# Patient Record
Sex: MALE | Race: WHITE | Employment: FULL TIME | URBAN - METROPOLITAN AREA
[De-identification: names, ages, dates, MRNs, and addresses within clinical notes are randomized per-mention and may not be internally consistent; named-entity substitution may affect disease eponyms.]

---

## 2022-02-21 ENCOUNTER — HOSPITAL ENCOUNTER (EMERGENCY)
Age: 27
Discharge: HOME | End: 2022-02-21
Payer: COMMERCIAL

## 2022-02-21 ENCOUNTER — APPOINTMENT (OUTPATIENT)
Dept: CT IMAGING | Age: 27
End: 2022-02-21
Payer: COMMERCIAL

## 2022-02-21 VITALS
RESPIRATION RATE: 16 BRPM | HEART RATE: 82 BPM | HEIGHT: 69 IN | BODY MASS INDEX: 44.43 KG/M2 | WEIGHT: 300 LBS | OXYGEN SATURATION: 98 % | DIASTOLIC BLOOD PRESSURE: 74 MMHG | SYSTOLIC BLOOD PRESSURE: 128 MMHG

## 2022-02-21 DIAGNOSIS — S39.012A STRAIN OF LUMBAR REGION, INITIAL ENCOUNTER: Primary | ICD-10-CM

## 2022-02-21 PROCEDURE — 6370000000 HC RX 637 (ALT 250 FOR IP): Performed by: PHYSICIAN ASSISTANT

## 2022-02-21 PROCEDURE — 72131 CT LUMBAR SPINE W/O DYE: CPT

## 2022-02-21 PROCEDURE — 6360000002 HC RX W HCPCS: Performed by: PHYSICIAN ASSISTANT

## 2022-02-21 PROCEDURE — 99283 EMERGENCY DEPT VISIT LOW MDM: CPT

## 2022-02-21 PROCEDURE — 96372 THER/PROPH/DIAG INJ SC/IM: CPT

## 2022-02-21 RX ORDER — CYCLOBENZAPRINE HCL 10 MG
10 TABLET ORAL ONCE
Status: COMPLETED | OUTPATIENT
Start: 2022-02-21 | End: 2022-02-21

## 2022-02-21 RX ORDER — NAPROXEN 500 MG/1
500 TABLET ORAL 2 TIMES DAILY WITH MEALS
Qty: 20 TABLET | Refills: 0 | Status: SHIPPED | OUTPATIENT
Start: 2022-02-21 | End: 2022-03-16

## 2022-02-21 RX ORDER — METHYLPREDNISOLONE 4 MG/1
TABLET ORAL
Qty: 1 KIT | Refills: 0 | Status: SHIPPED | OUTPATIENT
Start: 2022-02-21 | End: 2022-02-27

## 2022-02-21 RX ORDER — METHOCARBAMOL 500 MG/1
500 TABLET, FILM COATED ORAL 4 TIMES DAILY PRN
Qty: 30 TABLET | Refills: 0 | Status: SHIPPED | OUTPATIENT
Start: 2022-02-21 | End: 2022-03-03

## 2022-02-21 RX ORDER — PREDNISONE 20 MG/1
60 TABLET ORAL ONCE
Status: COMPLETED | OUTPATIENT
Start: 2022-02-21 | End: 2022-02-21

## 2022-02-21 RX ORDER — KETOROLAC TROMETHAMINE 30 MG/ML
30 INJECTION, SOLUTION INTRAMUSCULAR; INTRAVENOUS ONCE
Status: COMPLETED | OUTPATIENT
Start: 2022-02-21 | End: 2022-02-21

## 2022-02-21 RX ADMIN — PREDNISONE 60 MG: 20 TABLET ORAL at 16:07

## 2022-02-21 RX ADMIN — KETOROLAC TROMETHAMINE 30 MG: 30 INJECTION, SOLUTION INTRAMUSCULAR; INTRAVENOUS at 15:05

## 2022-02-21 RX ADMIN — CYCLOBENZAPRINE 10 MG: 10 TABLET, FILM COATED ORAL at 15:05

## 2022-02-21 ASSESSMENT — PAIN - FUNCTIONAL ASSESSMENT: PAIN_FUNCTIONAL_ASSESSMENT: 0-10

## 2022-02-21 ASSESSMENT — PAIN SCALES - GENERAL
PAINLEVEL_OUTOF10: 8
PAINLEVEL_OUTOF10: 9

## 2022-02-21 ASSESSMENT — PAIN DESCRIPTION - LOCATION: LOCATION: BACK

## 2022-02-21 ASSESSMENT — PAIN DESCRIPTION - ORIENTATION: ORIENTATION: LEFT;LOWER

## 2022-02-21 ASSESSMENT — PAIN DESCRIPTION - PAIN TYPE: TYPE: ACUTE PAIN

## 2022-02-21 NOTE — ED PROVIDER NOTES
[facility]      Department of Emergency Medicine   ED  Provider Note  Admit Date/RoomTime: 2/21/2022  1:35 PM  ED Room: 37/37        2:38 PM EST      HPI: Casey Cisneros 26 y.o.male with No past medical history on file. who presents with left sided lower back pain. The patient states that the back pain began 2 days. The history is obtained from the patient. The mechanism of the injury is apparent. The patient states that the pain is moderate. It is worsened by movement including flexion and extension of the back as well as rotation. Patient denies any distal neurovascular complaints including numbness tingling or weakness. There are no bowel or bladder symptoms reported. No incontinence and no urinary retention. The patient denies saddle or groin anesthesia. The patient also denies fevers, chills, weight loss, night sweats, IV drug use, or recent illness. Review of Systems:   Pertinent positives and negatives are stated within HPI, all other systems reviewed and are negative.      --------------------------------------------- PAST HISTORY ---------------------------------------------  Past Medical History:  has no past medical history on file. Past Surgical History:  has no past surgical history on file. Social History:  reports that he has never smoked. He does not have any smokeless tobacco history on file. He reports that he does not drink alcohol. Family History: family history is not on file. The patients home medications have been reviewed. Allergies: Patient has no known allergies. Nursing Notes Reviewed by myself  Vitals Signs Reviewed by myself  BP (!) 143/88   Pulse 88   Resp 16   Ht 5' 9\" (1.753 m)   Wt 300 lb (136.1 kg)   SpO2 98%   BMI 44.30 kg/m²         Physical exam:  Constitutional:  The patient is comfortable, well appearing, non toxic in NAD. Patient is alert and oriented x3. Head: Head is atraumatic and normocephalic.   Eyes: There is no discharge from the eyes. Sclerae are normal.  ENT: The oropharynx is normal. The mouth is normal to inspection. Neck: Normal range of motion of the neck is present there is no JVD present no meningeal signs are present. Respiratory/chest: The chest is nontender breath sounds are normal  Cardiovascular: Regular rate and rhythm is noted. No murmurs. No rubs or gallops. Skin: Skin is warm and dry, Skin exam normal  Neurologic exam: The patient's [place] Coma Scale is 15. No focal motor deficits. There are no focal sensory deficits. Deep tendon reflexes are intact and present bilaterally in the lower extremities. Babinski is absent. Gait is normal. Symmetric strength and sensation in the lower extremities bilaterally. Back exam: The patient has reproducible tenderness to palpation in the paravertebral lumbar region on the left. There is no evidence of localized erythema nor is there any focal warmth to the back. The patient has no evidence of single vertebral tenderness or any single area of interspace tenderness. There are no palpable deformities, lacerations, abrasions, or stepoffs. No midline cervical, thoracic, or lumbar spine tenderness. Medical decision making:   Mechanical lumbosacral strain without evidence of fracture or neurologic compromise.      Plan:  Review of past medical records for appropriate pain control and outpatient referral.               Impression:  Lumbosacral Strain    Disposition:  Discharge    Condition:  Stable        LIBRADO Garcia  02/21/22 0983

## 2022-02-28 ENCOUNTER — OFFICE VISIT (OUTPATIENT)
Dept: FAMILY MEDICINE CLINIC | Age: 27
End: 2022-02-28
Payer: COMMERCIAL

## 2022-02-28 VITALS
SYSTOLIC BLOOD PRESSURE: 115 MMHG | BODY MASS INDEX: 45.77 KG/M2 | RESPIRATION RATE: 18 BRPM | HEART RATE: 79 BPM | OXYGEN SATURATION: 98 % | DIASTOLIC BLOOD PRESSURE: 80 MMHG | TEMPERATURE: 97.3 F | WEIGHT: 309 LBS | HEIGHT: 69 IN

## 2022-02-28 DIAGNOSIS — M51.26 LUMBAR DISC HERNIATION: ICD-10-CM

## 2022-02-28 DIAGNOSIS — M54.50 LEFT-SIDED LOW BACK PAIN WITHOUT SCIATICA, UNSPECIFIED CHRONICITY: Primary | ICD-10-CM

## 2022-02-28 PROCEDURE — 99203 OFFICE O/P NEW LOW 30 MIN: CPT | Performed by: FAMILY MEDICINE

## 2022-02-28 RX ORDER — CYCLOBENZAPRINE HCL 5 MG
5 TABLET ORAL 3 TIMES DAILY PRN
Qty: 30 TABLET | Refills: 0 | Status: SHIPPED
Start: 2022-02-28 | End: 2022-03-16

## 2022-02-28 RX ORDER — TROLAMINE SALICYLATE 10 %
CREAM (GRAM) TOPICAL
Qty: 170 G | Refills: 0 | Status: SHIPPED | OUTPATIENT
Start: 2022-02-28 | End: 2022-03-07

## 2022-02-28 SDOH — ECONOMIC STABILITY: FOOD INSECURITY: WITHIN THE PAST 12 MONTHS, THE FOOD YOU BOUGHT JUST DIDN'T LAST AND YOU DIDN'T HAVE MONEY TO GET MORE.: NEVER TRUE

## 2022-02-28 SDOH — ECONOMIC STABILITY: FOOD INSECURITY: WITHIN THE PAST 12 MONTHS, YOU WORRIED THAT YOUR FOOD WOULD RUN OUT BEFORE YOU GOT MONEY TO BUY MORE.: NEVER TRUE

## 2022-02-28 ASSESSMENT — ENCOUNTER SYMPTOMS
RHINORRHEA: 0
COUGH: 0
VOMITING: 0
SHORTNESS OF BREATH: 0
DIARRHEA: 0
ABDOMINAL PAIN: 0
BACK PAIN: 1

## 2022-02-28 ASSESSMENT — PATIENT HEALTH QUESTIONNAIRE - PHQ9
SUM OF ALL RESPONSES TO PHQ9 QUESTIONS 1 & 2: 0
2. FEELING DOWN, DEPRESSED OR HOPELESS: 0
SUM OF ALL RESPONSES TO PHQ QUESTIONS 1-9: 0
1. LITTLE INTEREST OR PLEASURE IN DOING THINGS: 0
SUM OF ALL RESPONSES TO PHQ QUESTIONS 1-9: 0

## 2022-02-28 ASSESSMENT — SOCIAL DETERMINANTS OF HEALTH (SDOH): HOW HARD IS IT FOR YOU TO PAY FOR THE VERY BASICS LIKE FOOD, HOUSING, MEDICAL CARE, AND HEATING?: NOT HARD AT ALL

## 2022-02-28 NOTE — PROGRESS NOTES
[facility]  [facility]    Subjective:  New patient  Kee Martinez to ER for back pain  Back sprain after he bent  No red flag symptoms  CT lumbar spine - DDD and neural foraminal rarrowing  Prescribed - medrol dose pack, and muscle relaxors    ROS otherwise negative    Past medical, surgical, family and social history were reviewed, non-contributory, and unchanged unless otherwise stated. Objective:    /80   Pulse 79   Temp 97.3 °F (36.3 °C) (Temporal)   Resp 18   Ht 5' 9\" (1.753 m)   Wt (!) 309 lb (140.2 kg)   SpO2 98%   BMI 45.63 kg/m²     Exam is as noted by resident with the following changes, additions or corrections:    General:  NAD; alert & oriented x 3   Heart:  RRR, no murmurs, gallops, or rubs. Lungs:  CTA bilaterally, no wheeze, rales or rhonchi  Back: + mild tenderness   Extrem:  No clubbing, cyanosis, or edema    Assessment/Plan:    Back pain  continue NSAIDS  Aspercreme  PMR referral  Muscle relaxors  F/u as instructed     Attending Physician Statement  I have reviewed the chart, including any radiology or labs, and have seen the patient with the resident(s). I personally reviewed and performed key elements of the history and exam.  I agree with the assessment, plan and orders as documented by the resident. Please refer to the resident note for additional information.       Electronically signed by Marilia Grove MD on 2/28/2022 at 1:45 PM

## 2022-02-28 NOTE — PROGRESS NOTES
2/28/2022    Brody Varela is a 23 y.o. malehere for:    HPI:  CC- new patient  Here to establish care  Was just seen in ER a week ago for lumbar left sided back pain  Started after bending forward to put the pants on and immediately had left sided pain. CT lumbar spine showed: There is no evidence of acute fracture.  Vertebral alignment is anatomic. There are no compression deformities.  There is mild sclerosis at the   inferior endplate of L4 anteriorly with small Schmorl's node.  There is no   definite central canal stenosis.  There are disc bulges at L4-5 and L5-S1 as   well as possible left central disc protrusion at L5-S1.  There appears to be   right neural foraminal narrowing at L4-5 and L5-S1.  The paravertebral soft   tissue structures are unremarkable. Had toradol there. Discharged home with methocarbamol, medrol pack, naproxen BID  Since then, the pain has actually improved but is still there and patient is worried because he does do heavy duty at work, climbing ladder etc.  Has been going on for 1 week now. Worse with movement, mostly with extension of the back as well as rotation  Denies any associated numbness, tingling or weakness. There are no bowel or bladder symptoms reported. No incontinence and no urinary retention. The patient denies saddle or groin anesthesia. The patient also denies fevers, chills, weight loss, night sweats, IV drug use, or recent illness. Smoking 10 cigarettes a day- not ready yet to quit despite counseling         BP Readings from Last 3 Encounters:   02/28/22 115/80   02/21/22 128/74     Current Outpatient Medications   Medication Sig Dispense Refill    trolamine salicylate (ASPERCREME ORIGINAL) 10 % cream Apply topically as needed.  170 g 0    cyclobenzaprine (FLEXERIL) 5 MG tablet Take 1 tablet by mouth 3 times daily as needed for Muscle spasms 30 tablet 0    methocarbamol (ROBAXIN) 500 MG tablet Take 1 tablet by mouth 4 times daily as needed (pain) 30 tablet 0    naproxen (NAPROSYN) 500 MG tablet Take 1 tablet by mouth 2 times daily (with meals) 20 tablet 0     No current facility-administered medications for this visit. No Known Allergies    Past Medical & Surgical History:  History reviewed. No pertinent past medical history. Past Surgical History:   Procedure Laterality Date    LASIK      TYMPANOSTOMY TUBE PLACEMENT         Family History:      Problem Relation Age of Onset    Diabetes Mother        Social History:  Social History     Tobacco Use    Smoking status: Current Some Day Smoker     Types: Cigarettes    Smokeless tobacco: Current User   Substance Use Topics    Alcohol use: No       Immunization History   Administered Date(s) Administered    Adenovirus, Type 4 And 7, Live, Oral (Admin As 2 Tab) 07/06/2015    Anthrax (BioThrax) 03/21/2016, 03/22/2016, 06/07/2016    DTaP vaccine 05/04/2002    Hepatitis A/Hepatitis B (Twinrix) 07/06/2015, 08/06/2015, 02/09/2016    Influenza A (Y4H1-27) Vaccine PF IM 12/09/2009    Influenza Virus Vaccine 12/02/2015, 11/14/2016, 09/25/2017, 10/26/2018    MMR 10/12/2001    Meningococcal MCV4P (Menactra) 07/06/2015    Polio IPV (IPOL) 10/12/2001, 08/06/2015    Tdap (Boostrix, Adacel) 07/06/2015    Typhoid Vi capsular polysaccharide (Typhim VI) 08/06/2015, 10/24/2017    Yellow Fever (YF-Vax) 08/06/2015       Review of Systems   Constitutional: Negative for chills and fever. HENT: Negative for congestion and rhinorrhea. Eyes: Negative for visual disturbance. Respiratory: Negative for cough and shortness of breath. Cardiovascular: Negative for chest pain and leg swelling. Gastrointestinal: Negative for abdominal pain, diarrhea and vomiting. Genitourinary: Negative for dysuria and hematuria. Musculoskeletal: Positive for back pain. Negative for arthralgias. Skin: Negative for rash. Neurological: Negative for weakness and numbness.    Psychiatric/Behavioral: Negative for dysphoric mood. The patient is not nervous/anxious. VS:  /80   Pulse 79   Temp 97.3 °F (36.3 °C) (Temporal)   Resp 18   Ht 5' 9\" (1.753 m)   Wt (!) 309 lb (140.2 kg)   SpO2 98%   BMI 45.63 kg/m²     Physical Exam  Vitals reviewed. Constitutional:       General: He is not in acute distress. Appearance: Normal appearance. He is not ill-appearing. HENT:      Head: Normocephalic and atraumatic. Nose: Nose normal.      Mouth/Throat:      Mouth: Mucous membranes are moist.   Eyes:      General: No scleral icterus. Conjunctiva/sclera: Conjunctivae normal.   Cardiovascular:      Rate and Rhythm: Normal rate and regular rhythm. Heart sounds: Normal heart sounds. No murmur heard. Pulmonary:      Effort: Pulmonary effort is normal. No respiratory distress. Breath sounds: Normal breath sounds. No wheezing or rhonchi. Abdominal:      General: There is no distension. Palpations: Abdomen is soft. Tenderness: There is no abdominal tenderness. Musculoskeletal:         General: Tenderness (TTP prevertebral muscle tenderness midline lumbar and left sided muscles. no sensitivity or strength issues. ) present. Cervical back: Normal range of motion and neck supple. No rigidity. Right lower leg: No edema. Left lower leg: No edema. Comments: ROM limited on full extension because of pain, but can do it   Skin:     Findings: No rash. Neurological:      General: No focal deficit present. Mental Status: He is alert and oriented to person, place, and time. Sensory: No sensory deficit. Motor: No weakness. Psychiatric:         Mood and Affect: Mood normal.         Behavior: Behavior normal.         Assessment/Plan:  1. Left-sided low back pain without sciatica, unspecified chronicity  Seems musculoskeletal pain for this current episode. Does have hx of chronic intermittent back pain likely due to underlying disc herniation seen on the CT scan. No red flags. Supportive and symptomatic treatment with Aspercreme and continue with naproxen. Will add some Flexeril for symptomatic treatment. - trolamine salicylate (ASPERCREME ORIGINAL) 10 % cream; Apply topically as needed. Dispense: 170 g; Refill: 0  - [facility]en Lewis [facility], [facility]  - cyclobenzaprine (FLEXERIL) 5 MG tablet; Take 1 tablet by mouth 3 times daily as needed for Muscle spasms  Dispense: 30 tablet; Refill: 0    2. Lumbar disc herniation  Refer to PM&R. Patient may benefit from physical therapy  - [address] [place], [place] [address]Yen DO, [facility]      Follow up: As needed if symptoms worsen or fail to improve    Patient agrees with the above stated plan. Jenn Yeni received counseling on the following healthy behaviors: nutrition, exercise and tobacco cessation.     James Smith MD  PGY-3 Family Medicine

## 2022-03-02 ENCOUNTER — TELEPHONE (OUTPATIENT)
Dept: FAMILY MEDICINE CLINIC | Age: 27
End: 2022-03-02

## 2022-03-02 NOTE — TELEPHONE ENCOUNTER
----- Message from Padminicaron Joiedy sent at 3/2/2022 12:47 PM EST -----  Subject: Referral Request    QUESTIONS   Reason for referral request? Referred to physical therapy, but it won't   begin until 4/11. He needs relief before then because he can't do his job   currently. Pain is unchanged from previous appt. Please contact him to   advise. Has the physician seen you for this condition before? No   Preferred Specialist (if applicable)? Do you already have an appointment scheduled? No  Additional Information for Provider?   ---------------------------------------------------------------------------  --------------  CALL BACK INFO  What is the best way for the office to contact you? OK to leave message on   voicemail  Preferred Call Back Phone Number?  [phone]

## 2022-03-02 NOTE — TELEPHONE ENCOUNTER
As discussed in the office, there were no red flags, and he is started on medications for pain relief such as naproxen and flexeril and the cream locally.    Thank you

## 2022-03-04 ENCOUNTER — TELEPHONE (OUTPATIENT)
Dept: FAMILY MEDICINE CLINIC | Age: 27
End: 2022-03-04

## 2022-03-04 NOTE — LETTER
[address] [address]  Phone: [phone]  Fax: [phone]    Quique Parks MD        March 8, 2022     Patient: Ronny Jimenez   YOB: 1995   Date of Visit: 3/4/2022       To Whom It May Concern: It is my medical opinion that Themorales Bookman should remain out of work until 3/16/22, and be reevaluated from us at that time. If you have any questions or concerns, please don't hesitate to call.     Sincerely,        Quiqeu Parks MD

## 2022-03-04 NOTE — TELEPHONE ENCOUNTER
Patient called in regarding work, his job does not have light duty available, wanted to know if you could take him off work until he has some relief.

## 2022-03-04 NOTE — Clinical Note
[address] [address]  Phone: [phone]  Fax: [phone]    Amada Borges MD        March 8, 2022     Patient: Jenna Cabrera   YOB: 1995   Date of Visit: 3/4/2022       To Whom It May Concern: It is my medical opinion that Napoleon Cartagena {Work release (duty restriction):67629}. If you have any questions or concerns, please don't hesitate to call.     Sincerely,        Amada Borges MD

## 2022-03-08 NOTE — TELEPHONE ENCOUNTER
Spoke to patient. Back pain has improved since day one, but continues to have pain exacerbated with heavy lifting, bending forward or standing up for prolonged time. His work requires all these activities, so an excuse letter will be sent to patient for another week. He will need to be seen and evaluated in the office to decide forward.

## 2022-03-15 ENCOUNTER — OFFICE VISIT (OUTPATIENT)
Dept: FAMILY MEDICINE CLINIC | Age: 27
End: 2022-03-15
Payer: COMMERCIAL

## 2022-03-15 VITALS
OXYGEN SATURATION: 98 % | RESPIRATION RATE: 18 BRPM | WEIGHT: 310 LBS | TEMPERATURE: 97.5 F | HEIGHT: 69 IN | SYSTOLIC BLOOD PRESSURE: 110 MMHG | DIASTOLIC BLOOD PRESSURE: 80 MMHG | BODY MASS INDEX: 45.91 KG/M2 | HEART RATE: 85 BPM

## 2022-03-15 DIAGNOSIS — M54.50 LEFT-SIDED LOW BACK PAIN WITHOUT SCIATICA, UNSPECIFIED CHRONICITY: Primary | ICD-10-CM

## 2022-03-15 DIAGNOSIS — M51.26 LUMBAR DISC HERNIATION: ICD-10-CM

## 2022-03-15 PROCEDURE — 99213 OFFICE O/P EST LOW 20 MIN: CPT | Performed by: FAMILY MEDICINE

## 2022-03-15 NOTE — PROGRESS NOTES
Low back pain of 3 weeks  Was evaluated in the ED  CT disc herniation  Feeling better with rest  No radiation  Examination  Blood pressure 110/80, pulse 85, temperature 97.5 °F (36.4 °C), temperature source Temporal, resp. rate 18, height 5' 9\" (1.753 m), weight (!) 310 lb (140.6 kg), SpO2 98 %. No weakness  Normal reflexes  A/P  Follow up with [facility]  Attending Physician Statement  I have discussed the case, including pertinent history and exam findings with the resident. I agree with the documented assessment and plan.

## 2022-03-15 NOTE — PROGRESS NOTES
3/15/2022    Nessa Rashid is a 23 y.o. malehere for:    HPI:  CC- back pain  Back pain has improved since first day, but continues to have pain exacerbated with heavy lifting, bending forward or standing up for prolonged time. His work requires all these activities, so an excuse letter was sent to patient 2 weeks ago. Had CT lumbar spine done which showed:    1. No acute fracture or subluxation. 2. Mild degenerative changes in the lumbar spine without definite central   canal stenosis.  There is possible right neural foraminal narrowing at L4-5   and L5-S1. Has an appt with [facility] April 11 th   He is here today for deciding regarding clearance for work  Would like to have FMLA papers filled out because he continues to have the back pain with positional movements, even though it gas greatly improved. 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 Patient states that he feels that the back pain has greatly improved, but is worried if working will exacerbate or cause any damage. Denies any numbness or weakness, saddle anesthesia, urinary or fecal incontinence. No limping or any issues walking. Residual symptoms are only provoked left-sided back pain when standing up after sitting for prolonged time, sometimes when bending forward. Has not even been using any of the naproxen or Flexeril anymore. BP Readings from Last 3 Encounters:   03/15/22 110/80   02/28/22 115/80   02/21/22 128/74     Current Outpatient Medications   Medication Sig Dispense Refill    cyclobenzaprine (FLEXERIL) 5 MG tablet Take 1 tablet by mouth 3 times daily as needed for Muscle spasms 30 tablet 0    naproxen (NAPROSYN) 500 MG tablet Take 1 tablet by mouth 2 times daily (with meals) 20 tablet 0     No current facility-administered medications for this visit. No Known Allergies    Past Medical & Surgical History:  No past medical history on file.   Past Surgical History:   Procedure Laterality Date    LASIK      TYMPANOSTOMY TUBE PLACEMENT         Family History:      Problem Relation Age of Onset    Diabetes Mother        Social History:  Social History     Tobacco Use    Smoking status: Current Some Day Smoker     Types: Cigarettes    Smokeless tobacco: Current User   Substance Use Topics    Alcohol use: No       Immunization History   Administered Date(s) Administered    Adenovirus, Type 4 And 7, Live, Oral (Admin As 2 Tab) 07/06/2015    Anthrax (BioThrax) 03/21/2016, 03/22/2016, 06/07/2016    DTaP vaccine 05/04/2002    Hepatitis A/Hepatitis B (Twinrix) 07/06/2015, 08/06/2015, 02/09/2016    Influenza A (W3G2-19) Vaccine PF IM 12/09/2009    Influenza Virus Vaccine 12/02/2015, 11/14/2016, 09/25/2017, 10/26/2018    MMR 10/12/2001    Meningococcal MCV4P (Menactra) 07/06/2015    Polio IPV (IPOL) 10/12/2001, 08/06/2015    Tdap (Boostrix, Adacel) 07/06/2015    Typhoid Vi capsular polysaccharide (Typhim VI) 08/06/2015, 10/24/2017    Yellow Fever (YF-Vax) 08/06/2015       Review of Systems   Constitutional: Negative for chills and fever. HENT: Negative for congestion and rhinorrhea. Eyes: Negative for visual disturbance. Respiratory: Negative for cough and shortness of breath. Cardiovascular: Negative for chest pain and leg swelling. Gastrointestinal: Negative for abdominal pain, diarrhea and vomiting. Genitourinary: Negative for dysuria and hematuria. Musculoskeletal: Positive for back pain. Skin: Negative for rash. Neurological: Negative for weakness and numbness. Psychiatric/Behavioral: Negative for dysphoric mood. The patient is not nervous/anxious. VS:  /80   Pulse 85   Temp 97.5 °F (36.4 °C) (Temporal)   Resp 18   Ht 5' 9\" (1.753 m)   Wt (!) 310 lb (140.6 kg)   SpO2 98%   BMI 45.78 kg/m²     Physical Exam  Vitals reviewed. Constitutional:       General: He is not in acute distress. Appearance: Normal appearance. He is not ill-appearing.    HENT: Head: Normocephalic and atraumatic. Mouth/Throat:      Mouth: Mucous membranes are moist.   Eyes:      General: No scleral icterus. Conjunctiva/sclera: Conjunctivae normal.   Cardiovascular:      Rate and Rhythm: Normal rate and regular rhythm. Heart sounds: Normal heart sounds. No murmur heard. Pulmonary:      Effort: Pulmonary effort is normal. No respiratory distress. Breath sounds: Normal breath sounds. No wheezing or rhonchi. Abdominal:      General: There is no distension. Palpations: Abdomen is soft. Tenderness: There is no abdominal tenderness. Musculoskeletal:         General: Normal range of motion. Cervical back: Normal range of motion and neck supple. No rigidity. Right lower leg: No edema. Left lower leg: No edema. Comments: Normal range of motion of the back. No radiculopathy. Some paravertebral muscle tenderness over lumbar spine and left-sided muscles. No focal deficits strength or sensory. Skin:     Findings: No rash. Neurological:      General: No focal deficit present. Mental Status: He is alert and oriented to person, place, and time. Deep Tendon Reflexes: Reflexes normal.   Psychiatric:         Mood and Affect: Mood normal.         Behavior: Behavior normal.         Assessment/Plan:  Cori Beyer was seen today for back pain. Diagnoses and all orders for this visit:    Left-sided low back pain without sciatica, unspecified chronicity  Clinically improving with only some residual symptoms with positional movements. CT lumbar spine did not show any concerning findings just mild disc herniation. Patient cleared for work with restrictions as written on the letter. Has an upcoming appointment with [facility] April 14th. Lumbar disc herniation  As above. No lumbar radiculopathy. Follow up: In 6 weeks for back pain    Patient agrees with the above stated plan.       Bruna López MD  PGY-3 Family Medicine

## 2022-03-15 NOTE — LETTER
[address] [address]  Phone: [phone]  Fax: [phone]    Sarah Simons MD        March 15, 2022     Patient: Sergio Craig   YOB: 1995   Date of Visit: 3/15/2022       To Whom It May Concern: It is my medical opinion that Wilman Manzo may return to work on 3/16/22 with the following restrictions: lifting/carrying not to exceed 10 lbs. , pushing/pulling not to exceed 10 lbs. , bending/stooping not to exceed 10 x per hour, avoid excessive walking or standing, Duration of restrictions (days): Until seen by follow up provider on April 11 th. If you have any questions or concerns, please don't hesitate to call.     Sincerely,        Sarah Simons MD

## 2022-03-16 ASSESSMENT — ENCOUNTER SYMPTOMS
SHORTNESS OF BREATH: 0
DIARRHEA: 0
ABDOMINAL PAIN: 0
RHINORRHEA: 0
COUGH: 0
VOMITING: 0
BACK PAIN: 1

## 2022-04-11 ENCOUNTER — OFFICE VISIT (OUTPATIENT)
Dept: PHYSICAL MEDICINE AND REHAB | Age: 27
End: 2022-04-11
Payer: COMMERCIAL

## 2022-04-11 VITALS
HEART RATE: 89 BPM | HEIGHT: 69 IN | SYSTOLIC BLOOD PRESSURE: 141 MMHG | WEIGHT: 309 LBS | DIASTOLIC BLOOD PRESSURE: 85 MMHG | BODY MASS INDEX: 45.77 KG/M2

## 2022-04-11 DIAGNOSIS — M53.3 SACROILIAC JOINT DYSFUNCTION OF LEFT SIDE: Primary | ICD-10-CM

## 2022-04-11 DIAGNOSIS — M51.36 BULGING OF LUMBAR INTERVERTEBRAL DISC: ICD-10-CM

## 2022-04-11 PROCEDURE — 99204 OFFICE O/P NEW MOD 45 MIN: CPT | Performed by: PHYSICAL MEDICINE & REHABILITATION

## 2022-04-11 RX ORDER — IBUPROFEN 600 MG/1
TABLET ORAL
Qty: 120 TABLET | Refills: 2 | Status: SHIPPED
Start: 2022-04-11 | End: 2022-04-11 | Stop reason: ALTCHOICE

## 2022-04-11 RX ORDER — PREDNISONE 20 MG/1
60 TABLET ORAL DAILY
Qty: 30 TABLET | Refills: 0 | Status: SHIPPED | OUTPATIENT
Start: 2022-04-11 | End: 2022-04-21

## 2022-04-11 RX ORDER — ACETAMINOPHEN 500 MG
500 TABLET ORAL 4 TIMES DAILY PRN
Qty: 120 TABLET | Refills: 5 | Status: SHIPPED | OUTPATIENT
Start: 2022-04-11

## 2022-04-11 RX ORDER — IBUPROFEN 600 MG/1
600 TABLET ORAL EVERY 8 HOURS PRN
Qty: 120 TABLET | Refills: 2 | Status: SHIPPED | OUTPATIENT
Start: 2022-04-11 | End: 2022-05-11

## 2022-04-11 RX ORDER — IBUPROFEN 200 MG
200 TABLET ORAL EVERY 6 HOURS PRN
COMMUNITY

## 2022-04-11 NOTE — LETTER
[facility] [address]  Phone: [phone]  Fax: [phone] Jeyson Lehman DO        April 11, 2022     Patient: Ky Glass   YOB: 1995   Date of Visit: 4/11/2022       To Whom It May Concern: It is my medical opinion that Karel Tran is able to work as defined: Light work not  exerting up to 20 pounds of force occasionally, and/or up to 10 pounds of force frequently, and/or for a negligible amount of force constantly ( constantly: activity or condition exists two-thirds or more of the time) to move objects. Physical demand may be only a negligible amount, a job should be rated light work (1) when it requires walking or standing to a significant degree; or (2) when it requires sitting most of the time, but entails pushing and/or pulling of arms or leg controls; and/or (3) when the job requires working at a production rate pace entailing the constant pushing and/or pulling of materials even though the weight of those materials is negligible. .    If you have any questions or concerns, please don't hesitate to call. Sincerely,      Nura Gutiérrez D.O., P.T.   Board Certified Physical Medicine and Rehabilitation  Board Certified [phone] Margarita Lehman DO

## 2022-04-11 NOTE — PROGRESS NOTES
Yves Rodríguez [facility]  [address] [facility] [address]  Phone: [phone]  Fax: [phone]      PCP: eNda Norman MD  Date of visit: 4/13/22    Chief Complaint   Patient presents with    Back Pain     New patient referred by [facility]       Dear Dr. David Machado,    As you know,  Jack Nissen is a 23 y.o.  right hand dominant man with sudden onset of low back  pain after bending over to pick something up about 2 months ago. Now, the pain is constant and occurs daily. The pain is rated Pain Score:   4, is described as pressures, and is located in the left low back without radiation. The symptoms have been better since onset. The pain is better with sitting and resting. The pain is worse with lifting. I have reviewed the following prior workup has included: Dr. Inés Patel office notes. I have personally interpreted the following tests: Ct lumbar showed lumbar bulge L4-5 and L5-S1 with mild stenosis. The prior treatment has included: medrol dosepak, ibuprofen, muscle relaxer, ice. An independent historian was not needed. History reviewed. No pertinent past medical history. Past Surgical History:   Procedure Laterality Date    LASIK      TYMPANOSTOMY TUBE PLACEMENT       Social History     Tobacco Use    Smoking status: Current Some Day Smoker     Packs/day: 9.00     Types: Cigarettes    Smokeless tobacco: Current User   Substance Use Topics    Alcohol use: No    Drug use: Never   Works as a  for [employer].      Family History   Problem Relation Age of Onset    Diabetes Mother        No Known Allergies    Current Outpatient Medications   Medication Sig Dispense Refill    ibuprofen (ADVIL;MOTRIN) 200 MG tablet Take 200 mg by mouth every 6 hours as needed for Pain      predniSONE (DELTASONE) 20 MG tablet Take 3 tablets by mouth daily for 10 days 30 tablet 0    acetaminophen (TYLENOL) 500 MG tablet Take 1 tablet by mouth 4 times daily as needed for Pain Can take with prednisone 120 tablet 5    ibuprofen (ADVIL;MOTRIN) 600 MG tablet Take 1 tablet by mouth every 8 hours as needed for Pain (with food) Do not take with prednisone. 120 tablet 2    Elastic Bandages & Supports (LUMBAR BACK BRACE/SUPPORT PAD) MISC sacroiliac brace 1 each 0     No current facility-administered medications for this visit. Review of Systems - For review of systems, positive symptoms are underlined and negative findings are not underlined. General: chills, fatigue, fever, malaise, night sweats, weight gain,  weight loss. Psychological: anxiety, depression, suicidal ideation, sleep disturbances, behavioral disorder, difficulty concentrating, disorientation, hallucinations, mood swings, obsessive thoughts, physical abuse,  sexual abuse. Ophthalmic: blurry vision, decreased vision, double vision, loss of vision, photophobia, use of corrective device. Ear Nose Throat: hearing loss, tinnitus, phonophobia, sensitivity to smells, vertigo, or vocal changes. Allergy/Immunology: seasonal allergies, watery eyes, itchy eyes, frequent infections. Hematological and Lymphatic: bleeding problems, blood clots, bruising,  yellowing of the skin, swollen lymph nodes. Endocrine:  polydypsia, polyuria, temperature intolerance. Respiratory: cough, shortness of breath, wheezing. Cardiovascular: syncope, chest pain, dyspnea on exertion, edema, irregular heartbeat,  palpitations. Gastrointestinal: abdominal pain, constipation, diarrhea,  decreased appetite, heartburn, hematemesis, melena, nausea, vomiting, stool incontinence, abnormal swallowing. Genito-Urinary: dysuria, hematuria, incontinence, frequency, urgency. Musculoskeletal: joint pain, stiffness, swelling, muscle pain, muscle  tenderness.  Neurological: confusion, memory loss, dizziness, gait disturbance, headaches, impaired coordination, decreased balance, numbness/tingling, seizures, speech problems, tremors,weakness. Dermatological:  hair changes, nail changes, pruritus, rash. Physical Exam: Blood pressure (!) 141/85, pulse 89, height 5' 9\" (1.753 m), weight (!) 309 lb (140.2 kg). General: The patient is in no apparent distress. Body habitus is obese. HEENT: No rhinorrhea, sneezing, yawning, or lacrimation. No scleral icterus or conjunctival injection. SKIN: No piloerection. No track marks. No rash. Normal turgor. No erythema or ecchymosis. Psychological: Mood and affect are appropriate. Hygiene is appropriate. Cardiovascular:  Heart is regular rate and rhythm. Peripheral pulses are 2+ at the dorsalis pedis, posterior tibial and radial arteries. There is no edema. Respiratory: Respirations are regular and unlabored. There is no cyanosis. Lymphatic: There is no cervical or inguinal lymphadenopathy. Gastrointestinal: Soft abdomen, non-tender. No pulsating abdominal mass. Genitourinary: No costovertebral angle tenderness. MSK: Lumbar:  Cervical lordosis increased,  thoracic kyphosis normal and lumbar lordosis normal.No hairy patch, cafe au lait, nevi, hemangioma or dimpling over lumbar area. Pelvis level, no scoliosis, leg length equal. Seated and standing flexion tests negative. There is no step off deformity. No superficial or bony tenderness. Lumbar AROM in flexion is 60 degrees, in extension is 30 degrees, in left rotation is 30degrees, in right rotation is 30 degrees, in left lateral flexion is 3 degrees and in right lateral flexion is 0 degrees. There is tenderness to palpation at left sacroiliac joint. No tenderness to palpation at bilateral lumbosacral paraspinal muscles, right SIJ, bilateral gluteal muscles,  pubic tubercle,  PSIS,  ischial tuberosity,  greater trochanter,  ASIS,  iliac crest.  No trigger points. No spasm. No edema, erythema, ecchymosis,  mass or deformity. Taut bands absent. Popliteal angle is normal.  Seated straight leg raise negative bilaterally.   SIJ: Gillet negative bilaterally. PEREZ negative bilaterally. ASIS compression test negative bilaterally. Hip: Full painless AROM bilaterally. Srinivasa negative bilaterally. Trendelenburg negative bilaterally. Neurologic: Awake, alert and oriented in three planes. Speech is fluent. No facial weakness. Tongue is midline. Hearing is intact for conversation. Pupils are equal and round. Extraocular muscles are intact. Hearing is intact for conversation. Shoulder shrug symmetric. Sensation: Intact for light touch and pin prick in all upper and lower extremity dermatomes. Vibration and proprioception are intact at the bilateral first MTP. Strength: 5/5 in all myotomes in the upper and lower extremities. Muscle Tendon Reflexes: 2+ symmetric in the bilateral upper and lower extremities. Babinski is downgoing bilaterally. Mariella Kind is negative bilaterally. Gait is Normal.   Romberg is negative. Heel and toe walk are normal.  Tandem walk is normal.  No clonus or spasticity. The patient was able to rise from a chair and squat without difficulty. There is no tremor. Impression:   1. Sacroiliac joint dysfunction of left side    2.  Bulging of lumbar intervertebral disc        Plan:   I have ordered the following unique test(s):  Orders Placed This Encounter   Procedures    XR SACROILIAC JOINTS (MIN 3 VIEWS)     Standing Status:   Future     Number of Occurrences:   1     Standing Expiration Date:   4/12/2023     Order Specific Question:   Reason for exam:     Answer:   sacroiliac pain    Internal Referral to RETAIN     Referral Priority:   Routine     Referral Type:   Eval and Treat     Referral Reason:   Specialty Services Required     Number of Visits Requested:   [facility]     Referral Priority:   Routine     Referral Type:   Eval and Treat     Referral Reason:   Specialty Services Required     Requested Specialty:   Physical Therapy     Number of Visits Requested:   1     Prescription drug management has included:     Orders Placed This Encounter   Medications    predniSONE (DELTASONE) 20 MG tablet     Sig: Take 3 tablets by mouth daily for 10 days     Dispense:  30 tablet     Refill:  0    acetaminophen (TYLENOL) 500 MG tablet     Sig: Take 1 tablet by mouth 4 times daily as needed for Pain Can take with prednisone     Dispense:  120 tablet     Refill:  5    ibuprofen (ADVIL;MOTRIN) 600 MG tablet     Sig: Take 1 tablet by mouth every 8 hours as needed for Pain (with food) Do not take with prednisone. Dispense:  120 tablet     Refill:  2    Elastic Bandages & Supports (LUMBAR BACK BRACE/SUPPORT PAD) MISC     Sig: sacroiliac brace     Dispense:  1 each     Refill:  0      Sacroiliac support brace provided. Medications Discontinued During This Encounter   Medication Reason    ibuprofen (IBU) 800 MG tablet LIST CLEANUP    ibuprofen (ADVIL;MOTRIN) 600 MG tablet Therapy completed     Medications prescribed do require monitoring for toxicity including: CMP reviewed above. Diagnosis and treatment were significantly impacted by social determinants of health including patient is currently off work on short term disability. Reports employer unable to accommodate restrictions. BMI was elevated today, and weight loss plan recommended is : conventional weight loss. Current tobacco abuse, smoking cessation is recommended. There are significant medically determinable impairments. Jennifer Sanabria can hear, speak, remember, understand, concentrate, interact, and adapt without limitation. The claimaint can lift/carry up to 20 pounds at the waist level. Jennifer Sanabria can handle objects without limitation. The claimant can sit for a maximum of 60 minute intervals for a maximum of 8 hours per day;  can stand for a maximum of 30 minute intervals for a total of 8 hours per day; can be in the upright position either standing or walking for a total of 8 hours a day.  Jennifer Sanabria can walk independently community distances without assistive device. Prince may avoid climbing stairs, ramps, stooping, kneeling, crouching, crawling, prolonged sitting/standing/walking. The claimant should avoid repetitive bending, lifting, or twisting. Based on today's examination and review of medical records the claimant is expected to work at the following level:    [   ]  This injured worker has no work limitations. [   ]  This injured worker is incapable of work. [   ]  Lack of work function at any level. [ X ]  This injured worker is capable of work as defined below:    [   ]  \"Sedentary work\"- Sedentary work means exerting up to 10 pounds  of force occasionally (occassionally: activity or condition exists up to 1/3 of the time) and/or a negligible amount of force frequently (frequently: activity or condition exists from 1/3 up to 2/3 of the time) to lift, carry, push, pull or otherwise move objects. Sedentary work involves sitting most of the time, but may involve walking or standing for brief periods of time. Jobs are sedentary if walking and standing are required only occasionally and all other sedentary criteria are met. [  X ] \"Light work\": Light work means exerting up to 20 pounds of force occasionally, and/or up to 10 pounds of force frequently, and/or for a negligible amount of force constantly ( constantly: activity or condition exists two-thirds or more of the time) to move objects. Physical demand may be only a negligible amount, a job should be rated light work (1) when it requires walking or standing to a significant degree; or (2) when it requires sitting most of the time, but entails pushing and/or pulling of arms or leg controls; and/or (3) when the job requires working at a production rate pace entailing the constant pushing and/or pulling of materials even though the weight of those materials is negligible.     [   ] \"Medium work:  Medium work means exerting 20-50 pounds occasionally, and/or 10-25 pounds of force frequently, and/or greater than negligible up to ten pounds of force constantly to move an object. Physical demand requirements are in excess of those for light work. [   ]  Kamar Corn work\": Heavy work means exerting  pounds occasionally, and/or 25-50 pounds frequently, and/or 10-20 pounds constantly to move an object. Physical demand requirements are in excess of those required for medium work. [   ]  \"Very heavy work\": Very heavy work means exerting in excess of 100 pounds of force occasionally, and/or in excess of 50 pounds frequently, and/or in excess of 20   pounds of force constantly to move an object. Physical demand requirements are in excess of those required for heavy work. The claimant is expected to be able to participate in these activities 8 hours a day for 5 days a week on a full time basis. If employer cannot accommodate restrictions, than he cannot work at this time.  The patient was educated about the diagnosis, prognosis, indications, risks and benefits of treatment. An opportunity to ask questions was given to the patient and questions were answered. The patient agreed to proceed with the recommended treatment as described above. Return in about 6 weeks (around 5/23/2022). Thank you for the consultation and for allowing me to participate in the care of this patient. Sincerely,         Freddy Oconnell D.O., P.T.   Board Certified Physical Medicine and Rehabilitation  Board Certified Electrodiagnostic Medicine

## 2022-04-12 ENCOUNTER — TELEPHONE (OUTPATIENT)
Dept: PHYSICAL MEDICINE AND REHAB | Age: 27
End: 2022-04-12

## 2022-04-12 NOTE — TELEPHONE ENCOUNTER
----- Message from Leila Samuel DO sent at 4/12/2022  9:04 AM EDT -----  Test results are normal please notify patient.

## 2022-04-18 ENCOUNTER — EVALUATION (OUTPATIENT)
Dept: PHYSICAL THERAPY | Age: 27
End: 2022-04-18
Payer: COMMERCIAL

## 2022-04-18 DIAGNOSIS — M51.36 BULGING LUMBAR DISC: ICD-10-CM

## 2022-04-18 DIAGNOSIS — M53.3 SACROILIAC JOINT DYSFUNCTION OF LEFT SIDE: Primary | ICD-10-CM

## 2022-04-18 PROCEDURE — 97163 PT EVAL HIGH COMPLEX 45 MIN: CPT | Performed by: PHYSICAL THERAPIST

## 2022-04-18 NOTE — PROGRESS NOTES
[facility] OUTPATIENT REHABILITATION  PHYSICAL THERAPY INITIAL EVALUATION         Date:  2022   Patient: Julio C Larsen  : 1995  MRN: [id number]  Referring Provider: Zoya An DO   [facility]   [address], Arnaldo Muñoz [address] [place] [address]     Medical Diagnosis:   M53.3 (ICD-10-CM) - Sacroiliac joint dysfunction of left side   M51.26 (ICD-10-CM) - Bulging of lumbar intervertebral disc       Physician Order: Eval and Treat     SUBJECTIVE:     Onset date: 2022    Onset: Sudden     History / Mechanism of Injury: bent over at home felt severe L sided LBP, went to ER    Interventions for current problem: predisone    Chief complaint: pain in mornings, not working    Behavior: condition is getting better    Pain: intermittent  Current: 0/10     Best: 0/10     Worst:3/10    Symptom Type / Quality: aching  Location[de-identified] Back: lumbar region and left lateral side does not radiate    LB/LE Ratio: 100/0       Provoking Activities/Positions: walking                 Relieving Activitie/Positions: sitting    Disturbed Sleep: no  Bladder Dysfunction: no  Bowel Dysfunction: no     Imaging results: XR SACROILIAC JOINTS (MIN 3 VIEWS)    Result Date: 2022  EXAMINATION: THREE XRAY VIEWS OF THE SACRO-ILIAC JOINTS 2022 4:34 pm COMPARISON: None. HISTORY: ORDERING SYSTEM PROVIDED HISTORY: Sacroiliac joint dysfunction of left side TECHNOLOGIST PROVIDED HISTORY: Reason for exam:->sacroiliac pain FINDINGS: The SI joints appear unremarkable bilaterally. No evidence of ankylosis or bony erosion. No fractures. Unremarkable exam.       Past Medical History:  No past medical history on file.   Past Surgical History:   Procedure Laterality Date    LASIK      TYMPANOSTOMY TUBE PLACEMENT         Medications:   Current Outpatient Medications   Medication Sig Dispense Refill    ibuprofen (ADVIL;MOTRIN) 200 MG tablet Take 200 mg by mouth every 6 hours as needed for Pain      predniSONE (Velma Munch) 20 MG tablet Take 3 tablets by mouth daily for 10 days 30 tablet 0    acetaminophen (TYLENOL) 500 MG tablet Take 1 tablet by mouth 4 times daily as needed for Pain Can take with prednisone 120 tablet 5    ibuprofen (ADVIL;MOTRIN) 600 MG tablet Take 1 tablet by mouth every 8 hours as needed for Pain (with food) Do not take with prednisone. 120 tablet 2    Elastic Bandages & Supports (LUMBAR BACK BRACE/SUPPORT PAD) MISC sacroiliac brace 1 each 0     No current facility-administered medications for this visit. Occupation: [employer] technician. Physical demands include: assorted work positions (kneeling, crouching, crawling, stooping). Status: full time    Exercise regimen: none    Hobbies: none    Patient Goals: pain relief, get back to normal    Contraindications/Precautions: none    OBJECTIVE:     Estimated body mass index is 45.63 kg/m² as calculated from the following:    Height as of 4/11/22: 5' 9\" (1.753 m). Weight as of 4/11/22: 309 lb (140.2 kg). Observations: well nourished male, normal affect     Inspection: normal orthopedic exam         Gait: normal    Functional Strength:   Able to toe walk, heel walk, and squat. Range of Motion:    Trunk:    Flexion:  [x] Normal   [] Limited    Extension:  [x] Normal   [] Limited L LBP    Right Rotation: [x] Normal   [] Limited    Left Rotation:  [x] Normal   [] Limited    Right Side Bending: [x] Normal   [] Limited    Left Side Bending: [x] Normal   [] Limited L LBP     Compression more painful than tensile forces. Lower Extremity:   Right:   [x] Normal   [] Limited    Left:   [x] Normal   [] Limited       Strength:     Trunk: 5-/5   R LE: 5/5   L LE: 5/5    Palpation: Tender to palpation left lumbar paraspinal muscles and soft tissues. Sensation: intact to light touch and temperature.     Special Tests:   [x] Nerve Root Compression           Right []+ / [x] -    Left []+ / [x] -  [x] Slump           Right []+ / [x] -    Left []+ / [x] -  [] FADIR          Right []+ / [] -    Left []+ / [] -  [] S-I Distraction          Right []+ / [] -    Left []+ / [] -     [x] SLR           Right []+ / [x] -    Left []+ / [x] -     [] PEREZ          Right []+ / [] -    Left []+ / [] -  [] S-I Compression          Right []+ / [] -    Left []+ / [] -   [] Other: []+ / [] -       Special Test Comments: Compression causes pain, not neurological symptoms. ASSESSMENT     Gustavo has nonspecific LBP that is improving. He has a job that requires awkward positions and he has an 85lb ladder    Outcome Measure:   Oswestry Low Back Disability Questionnaire 6% disability    Problems:   Pain 3/10 intermittent  Limitations with work      [x] There are no barriers affecting plan of care or recovery    [] Barriers to this patient's plan of care or recovery include:      Domestic Concerns:  [x] No  [] Yes:    Short Term goals (1 week)   Pain 1/10    Long Term goals (3 weeks)   Pain 0/10   ROM no pain   Strength 5/5   Return to work  [employer] Low Back Disability Questionnaire 0% disability   Independent with home exercise program (HEP)    Rehab Potential: [x] Good  [] Fair  [] Poor    PLAN       Treatment Plan:   instruction in home exercise program   therapeutic exercise   therapeutic activity   manual therapy     The following CPT codes are likely to be used in the care of this patient:   96375 PT Evaluation: High Complexity   43071 Therapeutic Exercise   31903 Neuromuscular Re-Education   50282 Therapeutic Activities   14121 Manual Therapy     Suggested Professional Referral: [x] No  [] Yes:     Patient Education:  [x] Plans / Goals, Risks / Benefits discussed  [x] Home exercise program  Method of Education: [x] Verbal  [x] Demo  [x] Written  Comprehension of Education:  [x] Verbalizes understanding. [x] Demonstrates understanding. [] Needs Review. [] Demonstrates / verbalizes understanding of HEP / [place] previously given.     Frequency:  2-3 days per week for 3 weeks    Patient understands diagnosis/prognosis and consents to treatment, plan and goals: [x] Yes    [] No     Thank you for the opportunity to work with your patient. If you have questions or comments, please contact me at [phone]; fax: [phone]. Electronically signed by: Areta Klinefelter, PT         Please sign Physician's Certification and return to: [address] [place] [address] PHYSICAL THERAPY  [address] [address]  Dept: [phone]  Dept Fax: [phone]  Loc: [phone] Certification / Comments     Frequency/Duration 2-3 days per week for 3 weeks. Certification period from 4/18/2022  to 7/10/2022. I have reviewed the Plan of Care established for skilled therapy services and certify that the services are required and that they will be provided while the patient is under my care.     Physician's Comments/Revisions:               Physician's Printed Name:                                           [de-identified] Signature:                                                               Date:

## 2022-04-19 ENCOUNTER — TELEPHONE (OUTPATIENT)
Dept: PHYSICAL MEDICINE AND REHAB | Age: 27
End: 2022-04-19

## 2022-04-19 PROBLEM — M51.369 BULGING LUMBAR DISC: Status: ACTIVE | Noted: 2022-04-19

## 2022-04-19 PROBLEM — M53.3 SACROILIAC JOINT DYSFUNCTION OF LEFT SIDE: Status: ACTIVE | Noted: 2022-04-19

## 2022-04-19 PROBLEM — M51.36 BULGING LUMBAR DISC: Status: ACTIVE | Noted: 2022-04-19

## 2022-04-19 NOTE — PROGRESS NOTES
Physical Therapy Treatment Note    Date: 2022  Patient Name: Mesfin Patrick  : 1995   MRN: [id number]  [facility]: 2022  DOSx: -  Referring Provider: Jeannette Holalnd, [address] [place], [place] [address], [place] ,  [facility]     Medical Diagnosis:   M53.3 (ICD-10-CM) - Sacroiliac joint dysfunction of left side   M51.26 (ICD-10-CM) - Bulging of lumbar intervertebral disc       Outcome Measure:   Oswestry 6%      X = TO BE PERFORMED NEXT VISIT  > = PROGRESS TO THIS    S: See eval  O: Discussed anatomy, physiology, body mechanics, principles of loading, and progressive loading/activity. Time [id number]     Visit 1 Repeat outcome measure at mid point and end. Pain Pain at rest 0/10     ROM      Modalities Use sparingly if at all. Prefer an active program.     MH + ES   MO   Traction    MO         Manual      Sidelying frontal and transverse plane lumbar mobilizations with soft tissue mobilization    MT   ROM      Hooklying PPT   NR   SKTC x  TE   DKTC   TE   Seated flexion x  TE   Standing Trunk Extension    TE         Exercise      PPT Progression (supine > sitting > standing against wall > standing) x  NR   Crunches with PPT   NR   Mat marches with PPT   NR               ROWS: H   TE   ROWS: M  Reach and Pull x  TE   ROWS: L   Reach and Pull x  TE   Tubing Pushes   TE   Standing tubing crunch   TE   Corebuilder  x  NR   Marching   TA   Sidekicks    TA   Squats   TE   [place] deadlift 2-leg   TE   Alternating dumbbell shoulder press > Alternating dumbbell [place] x  TE                     A:  Tolerated well.     P: Continue with rehab plan  Mike Nicholson PT    Treatment Charges: Mins Units   Initial Evaluation 40 1   Re-Evaluation     Ther Exercise         TE     Manual Therapy     MT     Ther Activities        TA     Gait Training          GT     Neuro Re-education NR     Modalities     Non-Billable Service Time     Other     Total Time/Units 40 1

## 2022-04-19 NOTE — TELEPHONE ENCOUNTER
Called and left message on patient voicemail to see if he could come in today to get fitted for his SI belt. Will wait for return call from patient.

## 2022-04-21 ENCOUNTER — TREATMENT (OUTPATIENT)
Dept: PHYSICAL THERAPY | Age: 27
End: 2022-04-21
Payer: COMMERCIAL

## 2022-04-21 DIAGNOSIS — M51.36 BULGING LUMBAR DISC: ICD-10-CM

## 2022-04-21 DIAGNOSIS — M53.3 SACROILIAC JOINT DYSFUNCTION OF LEFT SIDE: Primary | ICD-10-CM

## 2022-04-21 PROCEDURE — 97110 THERAPEUTIC EXERCISES: CPT | Performed by: PHYSICAL THERAPIST

## 2022-04-21 NOTE — PROGRESS NOTES
Physical Therapy Treatment Note    Date: 2022  Patient Name: Ewa Beltran  : 1995   MRN: [id number]  [facility]: 2022  DOSx: -  Referring Provider: Eliceo Oconnell, [address] [place] [address] Kar[place], Arnaldo Muñoz ,  [facility]     Medical Diagnosis:   M53.3 (ICD-10-CM) - Sacroiliac joint dysfunction of left side   M51.26 (ICD-10-CM) - Bulging of lumbar intervertebral disc       Outcome Measure:   Oswestry 6%      X = TO BE PERFORMED NEXT VISIT  > = PROGRESS TO THIS    S: feels pretty good  O: Discussed anatomy, physiology, body mechanics, principles of loading, and progressive loading/activity. Access Code: [email or web address]  URL: [email or web address]  Date: 2022  Prepared by: Pratik Body    Exercises  Standing Row with Anchored Resistance - 1 x daily - 7 x weekly - 2 sets - 15-15 reps  Standing Bent Over Low Shoulder Row with Anchored Resistance - 1 x daily - 7 x weekly - 2 sets - 15-15 reps  Pushing Simulation with Anchored Resistance - 1 x daily - 7 x weekly - 2 sets - 15-15 reps      Time 7076-8117     Visit 1 Repeat outcome measure at mid point and end. Pain Pain at rest 0/10     ROM      Modalities Use sparingly if at all.   Prefer an active program.     MH + ES   MO   Traction    MO         Manual      Sidelying frontal and transverse plane lumbar mobilizations with soft tissue mobilization    MT   ROM      Hooklying PPT   NR   SKTC x  TE   DKTC   TE   Seated flexion x  TE   Standing Trunk Extension    TE         Exercise      PPT Progression (supine > sitting > standing against wall > standing) x  NR   Crunches with PPT   NR   Mat marches with PPT   NR               ROWS: H   TE   ROWS: M  Reach and Pull 15/15 x 2 15 w/ each foot forward TE   ROWS: L   Reach and Pull 15/15 x 2 15 w/ each foot forward TE   Tubing Pushes 15/15 x 2  TE   Standing tubing crunch   TE   Corebuilder  x  NR   Marching   TA   Sidekicks    TA   Squats   TE   [place] deadlift 2-leg   TE   Alternating dumbbell shoulder press > Alternating dumbbell [place] 2lb DBs 2 x 15  TE                     A:  Tolerated well.     P: Continue with rehab plan  Aleida Hawkins, PT    Treatment Charges: Mins Units   Initial Evaluation     Re-Evaluation     Ther Exercise         TE 40 3   Manual Therapy     MT     Ther Activities        TA     Gait Training          GT     Neuro Re-education NR     Modalities     Non-Billable Service Time     Other     Total Time/Units 40 3

## 2022-04-25 NOTE — TELEPHONE ENCOUNTER
2nd attempt to contact the patient. Called and left message on patient voicemail that I was calling to have him come in for SI belt. Will wait for return call from patient.

## 2022-04-27 ENCOUNTER — TREATMENT (OUTPATIENT)
Dept: PHYSICAL THERAPY | Age: 27
End: 2022-04-27
Payer: COMMERCIAL

## 2022-04-27 DIAGNOSIS — M53.3 SACROILIAC JOINT DYSFUNCTION OF LEFT SIDE: Primary | ICD-10-CM

## 2022-04-27 DIAGNOSIS — M51.36 BULGING LUMBAR DISC: ICD-10-CM

## 2022-04-27 PROCEDURE — 97110 THERAPEUTIC EXERCISES: CPT | Performed by: PHYSICAL THERAPIST

## 2022-04-27 NOTE — PROGRESS NOTES
Physical Therapy Treatment Note    Date: 2022  Patient Name: Meryle Moeller  : 1995   MRN: [id number]  [facility]: 2022  DOSx: -  Referring Provider: Hamzah Quispe, [address] [place] [address], [place] [address],  [facility] Diagnosis:   M53.3 (ICD-10-CM) - Sacroiliac joint dysfunction of left side   M51.26 (ICD-10-CM) - Bulging of lumbar intervertebral disc       Outcome Measure:   Oswestry 6%      X = TO BE PERFORMED NEXT VISIT  > = PROGRESS TO THIS    S: See eval  O: Discussed anatomy, physiology, body mechanics, principles of loading, and progressive loading/activity. Access Code: [email or web address]  URL: [email or web address]  Date: 2022  Prepared by: Sharin Sandhoff    Exercises  Standing Row with Anchored Resistance - 1 x daily - 7 x weekly - 2 sets - 15-15 reps  Standing Bent Over Low Shoulder Row with Anchored Resistance - 1 x daily - 7 x weekly - 2 sets - 15-15 reps  Pushing Simulation with Anchored Resistance - 1 x daily - 7 x weekly - 2 sets - 15-15 reps      Time 7884-5654     Visit 1 Repeat outcome measure at mid point and end. Pain Pain at rest 0/10     ROM      Modalities Use sparingly if at all.   Prefer an active program.     MH + ES   MO   Traction    MO         Manual      Sidelying frontal and transverse plane lumbar mobilizations with soft tissue mobilization    MT   ROM      Hooklying PPT   NR   SKTC x  TE   DKTC   TE   Seated flexion x  TE   Standing Trunk Extension    TE         Exercise      PPT Progression (supine > sitting > standing against wall > standing) x  NR   Crunches with PPT   NR   Mat marches with PPT   NR   nustep 5 min           ROWS: H   TE   ROWS: M  Reach and Pull 15/15 x 2 15 w/ each foot forward TE   ROWS: L   Reach and Pull 15/15 x 2 15 w/ each foot forward TE   Tubing Pushes 15/15 x 2  TE   Standing tubing crunch   TE   Corebuilder  x  NR   Marching   TA   Sidekicks    TA   Squats   TE   [place] deadlift 2-leg   TE   Alternating dumbbell shoulder press > Alternating dumbbell [place] 5lb DBs 2 x 10  TE                     A:  Tolerated well.     P: Continue with rehab plan  Christine Wong PT    Treatment Charges: Mins Units   Initial Evaluation     Re-Evaluation     Ther Exercise         TE 40 3   Manual Therapy     MT     Ther Activities        TA     Gait Training          GT     Neuro Re-education NR     Modalities     Non-Billable Service Time     Other     Total Time/Units 40 3

## 2022-05-02 ENCOUNTER — TREATMENT (OUTPATIENT)
Dept: PHYSICAL THERAPY | Age: 27
End: 2022-05-02
Payer: COMMERCIAL

## 2022-05-02 DIAGNOSIS — M51.36 BULGING LUMBAR DISC: ICD-10-CM

## 2022-05-02 DIAGNOSIS — M53.3 SACROILIAC JOINT DYSFUNCTION OF LEFT SIDE: Primary | ICD-10-CM

## 2022-05-02 PROCEDURE — 97110 THERAPEUTIC EXERCISES: CPT

## 2022-05-02 NOTE — PROGRESS NOTES
Physical Therapy Treatment Note    Date: 2022  Patient Name: Prince Alonso  : 1995   MRN: [id number]  [facility]: 2022  DOSx: -  Referring Provider: Milton Shaikh, [address] [place] [address], [place] [address],  [facility]     Medical Diagnosis:   M53.3 (ICD-10-CM) - Sacroiliac joint dysfunction of left side   M51.26 (ICD-10-CM) - Bulging of lumbar intervertebral disc       Outcome Measure:   Oswestry 6%      X = TO BE PERFORMED NEXT VISIT  > = PROGRESS TO THIS    S: feeling much better. At least 75% improvement  O: Discussed anatomy, physiology, body mechanics, principles of loading, and progressive loading/activity. Access Code: [email or web address]  URL: [email or web address]  Date: 2022  Prepared by: Priscila Palmer    Exercises  Standing Row with Anchored Resistance - 1 x daily - 7 x weekly - 2 sets - 15-15 reps  Standing Bent Over Low Shoulder Row with Anchored Resistance - 1 x daily - 7 x weekly - 2 sets - 15-15 reps  Pushing Simulation with Anchored Resistance - 1 x daily - 7 x weekly - 2 sets - 15-15 reps      Time 1000-     Visit 4 Ref # [id number]  No copay  No pre auth  Visits- 30    Pain Pain at rest 0/10     ROM      Modalities Use sparingly if at all.   Prefer an active program.     MH + ES   MO   Traction    MO         Manual      Sidelying frontal and transverse plane lumbar mobilizations with soft tissue mobilization    MT   ROM      Hooklying PPT   NR   SKTC x  TE   DKTC   TE   Seated flexion x  TE   Standing Trunk Extension    TE         Exercise      PPT Progression (supine > sitting > standing against wall > standing) x  NR   Crunches with PPT   NR   Mat marches with PPT   NR   nustep 5 min           ROWS: H  BLK 2 x 20  TE   ROWS: M  Reach and Pull BLK 2 x 20 15 w/ each foot forward TE   ROWS: L   Reach and Pull blk 2 x 20 15 w/ each foot forward TE   Tubing Pushes blk 2 x 20  TE   Standing tubing crunch   TE   Corebuilder  x  NR   Marching   TA   Sidekicks    TA   Squats   TE [facility] deadlift 2-leg 10# 3 x 10  TE   Alternating dumbbell shoulder press > Alternating dumbbell [place] 5lb DBs 2 x 20  TE                     A:  Tolerated well. Added deadlift, no increased pain.     P: Continue with rehab plan  Zoya Tomlinson, PTA    Treatment Charges: Mins Units   Initial Evaluation     Re-Evaluation     Ther Exercise         TE 40 3   Manual Therapy     MT     Ther Activities        TA     Gait Training          GT     Neuro Re-education NR     Modalities     Non-Billable Service Time     Other     Total Time/Units 40 3

## 2024-07-29 ASSESSMENT — PATIENT HEALTH QUESTIONNAIRE - PHQ9
SUM OF ALL RESPONSES TO PHQ9 QUESTIONS 1 & 2: 2
SUM OF ALL RESPONSES TO PHQ QUESTIONS 1-9: 2
2. FEELING DOWN, DEPRESSED OR HOPELESS: NOT AT ALL
2. FEELING DOWN, DEPRESSED OR HOPELESS: NOT AT ALL
1. LITTLE INTEREST OR PLEASURE IN DOING THINGS: MORE THAN HALF THE DAYS
1. LITTLE INTEREST OR PLEASURE IN DOING THINGS: MORE THAN HALF THE DAYS
SUM OF ALL RESPONSES TO PHQ QUESTIONS 1-9: 2
SUM OF ALL RESPONSES TO PHQ9 QUESTIONS 1 & 2: 2

## 2024-07-30 ENCOUNTER — OFFICE VISIT (OUTPATIENT)
Dept: FAMILY MEDICINE CLINIC | Age: 29
End: 2024-07-30
Payer: COMMERCIAL

## 2024-07-30 VITALS
HEIGHT: 69 IN | RESPIRATION RATE: 16 BRPM | BODY MASS INDEX: 46.65 KG/M2 | HEART RATE: 78 BPM | TEMPERATURE: 98.6 F | SYSTOLIC BLOOD PRESSURE: 138 MMHG | OXYGEN SATURATION: 97 % | DIASTOLIC BLOOD PRESSURE: 88 MMHG | WEIGHT: 315 LBS

## 2024-07-30 DIAGNOSIS — R51.9 FACE PAIN: ICD-10-CM

## 2024-07-30 DIAGNOSIS — M53.3 SACROILIAC JOINT DYSFUNCTION OF LEFT SIDE: Primary | ICD-10-CM

## 2024-07-30 PROCEDURE — 99213 OFFICE O/P EST LOW 20 MIN: CPT

## 2024-07-30 SDOH — ECONOMIC STABILITY: HOUSING INSECURITY
IN THE LAST 12 MONTHS, WAS THERE A TIME WHEN YOU DID NOT HAVE A STEADY PLACE TO SLEEP OR SLEPT IN A SHELTER (INCLUDING NOW)?: NO

## 2024-07-30 SDOH — ECONOMIC STABILITY: FOOD INSECURITY: WITHIN THE PAST 12 MONTHS, THE FOOD YOU BOUGHT JUST DIDN'T LAST AND YOU DIDN'T HAVE MONEY TO GET MORE.: NEVER TRUE

## 2024-07-30 SDOH — ECONOMIC STABILITY: FOOD INSECURITY: WITHIN THE PAST 12 MONTHS, YOU WORRIED THAT YOUR FOOD WOULD RUN OUT BEFORE YOU GOT MONEY TO BUY MORE.: NEVER TRUE

## 2024-07-30 SDOH — ECONOMIC STABILITY: INCOME INSECURITY: HOW HARD IS IT FOR YOU TO PAY FOR THE VERY BASICS LIKE FOOD, HOUSING, MEDICAL CARE, AND HEATING?: NOT HARD AT ALL

## 2024-07-30 ASSESSMENT — ENCOUNTER SYMPTOMS
NAUSEA: 0
CHEST TIGHTNESS: 0
SHORTNESS OF BREATH: 0
COUGH: 0
BACK PAIN: 1
ABDOMINAL PAIN: 0
CONSTIPATION: 0
VOMITING: 0
WHEEZING: 0
DIARRHEA: 0
BLOOD IN STOOL: 0

## 2024-07-30 NOTE — PROGRESS NOTES
[facility]  Precepting Note    Subjective:  Hx of fall in the  that caused chronic back pain.  [facility]  SI dysfunction.  Seeking disability benefits. Wants paperwork reviewed, doesn't have it with him today.     ~ mastoid area pain  Years  Worse in the cold months.   Sharp pain behind his ear. When he turns neck worsens the pain. Pushing on the ear/massage helps. He appreciates swelling/tightness at times.   No hearing changes, drainage, vertigo.   Takes ibu and tylenol.     ROS otherwise negative     Past medical, surgical, family and social history were reviewed, non-contributory, and unchanged unless otherwise stated.    Objective:    /88   Pulse 78   Temp 98.6 °F (37 °C) (Temporal)   Resp 16   Ht 1.753 m (5' 9\")   Wt (!) 148 kg (326 lb 4.5 oz)   SpO2 97%   BMI 48.18 kg/m²     Exam is as noted by resident with the following changes, additions or corrections:    General:  NAD; alert & oriented x 3   Heart:  RRR, no murmurs, gallops, or rubs.  Lungs:  CTA bilaterally, no wheeze, rales or rhonchi  Abd: bowel sounds present, nontender, nondistended, no masses  Extrem:  No clubbing, cyanosis, or edema  ENT: ipsilateral ear with scarred drum consistent with possible tymp tubes when younger, no OME or signs of infection. No malformation or trauma.     Assessment/Plan:  Chronic low back pain / SI joint pain, managed by [facility], and seeking civilian disability support.   He'll bring in his paperwork and we'll review it. We may not fill it out.     Mastoid area pain, D.D. includes TMJ pain, Ear Problem, Mastoiditis (not likely), SCM origin pain suspected.   Has already had advanced imaging with [facility] through the [facility] reportedly. Assuming that was normal, most suspect SCM origin pain given no ear symptoms and worse with turning his head. Uncertain when worse in the colder months of the year. Suspecting a MSK source, a course of PT may be most beneficial, and is

## 2024-07-30 NOTE — PROGRESS NOTES
[facility]  Department of Family Medicine  Family Medicine Residency Program      Patient: Juni Ayala 28 y.o. male     Date of Service: 7/30/24      Chief complaint:   Chief Complaint   Patient presents with    Annual Exam     Needs civilian doctor to back claim that injuries/disabilities were sustained during  service (has paperwork he forgot)    Otalgia     Occasional pain and swelling  Mostly left ear, sometimes right  Only way to resolve pain is to push on with decent pressure for a period of time  Causes pain in turning neck as well       HISTORY OF PRESENTING ILLNESS     28 y.o. male presented to the clinic for a physical exam,     [facility], has Sacro-iliac dysfunction. Followed with Physical therapy 2 years ago  Requires paperwork to back the claim that he sustained injuries during  service, Worked in [employer] in [employer], landed on back after falling 3 decks onto his back, did not initially get evaluated.. Has forgot paperwork this visit but will bring to . Lower back hurts the worst, may have had bulging discs. No surgery. Physical therapy helped.     Also with otalgia, mostly left ear, causes pain when turning his neck, claims only way to resolve the pain is to \"push on\" his ear for a period of time. Has seen [facility] but got no help. Throbbing pain, only relief is to press on it, been going on for years, worse in winter. Feels \"swollen\" and hurt behind ear. No hearing loss. No drainage. No vertigo. Unlikely mastoiditis, no fever, chills.        Health Maintenance:  Health Maintenance Due   Topic Date Due    COVID-19 Vaccine (1) Never done    Varicella vaccine (1 of 2 - 2-dose childhood series) Never done    Pneumococcal 0-64 years Vaccine (1 of 2 - PCV) Never done    HIV screen  Never done    Hepatitis C screen  Never done    Polio vaccine (3 of 3 - 4-dose series) 02/06/2016    Hepatitis A vaccine (2 of 2 - 2-dose series) 08/09/2016

## 2024-08-06 ENCOUNTER — OFFICE VISIT (OUTPATIENT)
Dept: FAMILY MEDICINE CLINIC | Age: 29
End: 2024-08-06
Payer: COMMERCIAL

## 2024-08-06 VITALS
OXYGEN SATURATION: 97 % | WEIGHT: 315 LBS | DIASTOLIC BLOOD PRESSURE: 75 MMHG | TEMPERATURE: 97.7 F | HEIGHT: 69 IN | RESPIRATION RATE: 16 BRPM | SYSTOLIC BLOOD PRESSURE: 114 MMHG | HEART RATE: 77 BPM | BODY MASS INDEX: 46.65 KG/M2

## 2024-08-06 DIAGNOSIS — M51.36 BULGING LUMBAR DISC: ICD-10-CM

## 2024-08-06 DIAGNOSIS — M53.3 SACROILIAC JOINT DYSFUNCTION OF LEFT SIDE: Primary | ICD-10-CM

## 2024-08-06 DIAGNOSIS — F41.9 ANXIETY: ICD-10-CM

## 2024-08-06 DIAGNOSIS — H93.13 BILATERAL TINNITUS: ICD-10-CM

## 2024-08-06 PROCEDURE — 99213 OFFICE O/P EST LOW 20 MIN: CPT

## 2024-08-06 ASSESSMENT — ENCOUNTER SYMPTOMS
DIARRHEA: 0
CHEST TIGHTNESS: 0
VOMITING: 0
BLOOD IN STOOL: 0
COUGH: 0
BACK PAIN: 1
CONSTIPATION: 0
NAUSEA: 0
WHEEZING: 0
ABDOMINAL PAIN: 0
SHORTNESS OF BREATH: 0

## 2024-08-06 NOTE — PROGRESS NOTES
Follow up of chronic low back pain.  History of a fall.  Had physical therapy in the past.  Also anxiety and depression.  Works as technician  Examination  Blood pressure 114/75, pulse 77, temperature 97.7 °F (36.5 °C), temperature source Temporal, resp. rate 16, height 1.753 m (5' 9\"), weight (!) 147.9 kg (326 lb), SpO2 97 %.   Scarring of TMs  Limited ROM of back  No weakness  A/P  Chronic low back pain  Maintain activity and weight loss. [facility].  Anxiety   Tinnitus and hearing loss  Refer to ENT  Attending Physician Statement  I have discussed the case, including pertinent history and exam findings with the resident. I agree with the documented assessment and plan.

## 2024-08-06 NOTE — PROGRESS NOTES
[facility]  Department of Family Medicine  Family Medicine Residency Program      Patient: Juni Ayala 28 y.o. male     Date of Service: 8/6/24      Chief complaint:   Chief Complaint   Patient presents with    Chronic Pain     Back pain/ injuries       HISTORY OF PRESENTING ILLNESS     28 y.o. male presented to the clinic for a follow up regarding paperwork.     [facility], has Sacro-iliac dysfunction. Followed with Physical therapy 2 years ago. Bilateral knee pain following fall and kneeling on them during service as well, does understand his weight plays role, would like to lose weight, though concerned it is chronic as they \"pop\" often.   Requires paperwork to back the claim that he sustained injuries during  service, Worked in [employer] in [employer], landed on back after falling 3 decks onto his back, did not initially get evaluated. Lower back hurts the worst, may have had bulging discs. No surgery. Physical therapy helped.     Anxiety/depression- shuts down at work under stress likely 2/2 to trauma endured, has nightmares regarding his fall.  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 [facility], had worse air conduction in right ear than left. Has pain in ear and behind ear. Ice packs did not help. Would like ENT to evaluate given hearing troubles and pain.     Health Maintenance:  Health Maintenance Due   Topic Date Due    COVID-19 Vaccine (1) Never done    Varicella vaccine (1 of 2 - 2-dose childhood series) Never done    Pneumococcal 0-64 years Vaccine (1 of 2 - PCV) Never done    HIV screen  Never done    Hepatitis C screen  Never done    Polio vaccine (3 of 3 - 4-dose series) 02/06/2016    Hepatitis A vaccine (2 of 2 - 2-dose series) 08/09/2016    Flu vaccine (1) 08/01/2024     Past Medical History:  No past

## 2024-12-18 ENCOUNTER — PROCEDURE VISIT (OUTPATIENT)
Dept: AUDIOLOGY | Age: 29
End: 2024-12-18
Payer: COMMERCIAL

## 2024-12-18 ENCOUNTER — OFFICE VISIT (OUTPATIENT)
Dept: ENT CLINIC | Age: 29
End: 2024-12-18
Payer: COMMERCIAL

## 2024-12-18 VITALS
OXYGEN SATURATION: 96 % | BODY MASS INDEX: 46.65 KG/M2 | HEIGHT: 69 IN | DIASTOLIC BLOOD PRESSURE: 78 MMHG | SYSTOLIC BLOOD PRESSURE: 120 MMHG | WEIGHT: 315 LBS | HEART RATE: 75 BPM | TEMPERATURE: 97.9 F

## 2024-12-18 DIAGNOSIS — H92.02 OTALGIA, LEFT: Primary | ICD-10-CM

## 2024-12-18 DIAGNOSIS — H92.02 EAR PAIN, LEFT: Primary | ICD-10-CM

## 2024-12-18 DIAGNOSIS — H93.13 TINNITUS OF BOTH EARS: ICD-10-CM

## 2024-12-18 PROCEDURE — 99203 OFFICE O/P NEW LOW 30 MIN: CPT

## 2024-12-18 PROCEDURE — 92567 TYMPANOMETRY: CPT | Performed by: AUDIOLOGIST

## 2024-12-18 ASSESSMENT — ENCOUNTER SYMPTOMS
BACK PAIN: 0
SHORTNESS OF BREATH: 0
DIARRHEA: 0
RHINORRHEA: 0
EYE PAIN: 0
SINUS PRESSURE: 0
SORE THROAT: 0
EYE DISCHARGE: 0
COUGH: 0
VOMITING: 0
ALLERGIC/IMMUNOLOGIC NEGATIVE: 1

## 2024-12-18 NOTE — PROGRESS NOTES
Subjective:     Patient ID:  Juni Ayala is a 29 y.o. male.    HPI:    Hearing Loss  Patient presents today with complaints of hearing loss.  Concern regarding hearing has been present for 10 years. He has failed a prior hearing test.The patient reports difficulty hearing, saying \"huh\" or \"what\".     Tinnitus  Patient presents with tinnitus.Onset of symptoms was gradual 10 years ago ago with unchanged course since that time. Patient describes the tinnitus as recurrent located in the bilateral ear. The quality is described as high pitch that sounds like tone. The pattern is nonpulsatile with an intensity that is really loud then fades away. Patient describes his level of annoyance as minimally annoying, intermittently aware. Associated symptoms include hearing loss and pain Family history is negative family history for tinnitusPatient has had a prior evaluation for tinnitus by [facility] patient did not see results .  Previous treatments include none.      Patient does not have hearing aids at this time.  History of Head trauma: no   Description: none  History of surgery to the head/neck: no   Description:none  History of cerumen impaction: no  History of noise exposure: yes   Type: Since 2015 being in the [employer]  Spinning: no  Hearing loss: yes    Fluctuating: no  Aural pressure: yes  Tinnitus:yes  Otalgia:yes happens about once a month    History reviewed. No pertinent past medical history.  Past Surgical History:   Procedure Laterality Date    LASIK      TYMPANOSTOMY TUBE PLACEMENT       Family History   Problem Relation Age of Onset    Diabetes Mother      Social History     Socioeconomic History    Marital status: Single     Spouse name: None    Number of children: None    Years of education: None    Highest education level: None   Tobacco Use    Smoking status: Former     Current packs/day: 9.00     Types: Cigarettes    Smokeless tobacco: Current   Substance and Sexual Activity    Alcohol use:

## 2024-12-20 NOTE — PROGRESS NOTES
This patient was referred for audiometric/tympanometric testing by RASHMI Walter due to tinnitus and left ear pain. Patient has [facility] recently       Tympanometry revealed normal middle ear peak pressure and compliance, in the right ear and hypermobility in the left ear.             The results were reviewed with the patient and CNP.     Recommendations for follow up will be made pending ordering provider consult.    Rodríguez Srivastava/CCC-A  [place] Lic # [id number]

## 2025-04-16 ENCOUNTER — OFFICE VISIT (OUTPATIENT)
Dept: ENT CLINIC | Age: 30
End: 2025-04-16
Payer: COMMERCIAL

## 2025-04-16 VITALS
RESPIRATION RATE: 18 BRPM | HEART RATE: 75 BPM | HEIGHT: 69 IN | WEIGHT: 315 LBS | DIASTOLIC BLOOD PRESSURE: 84 MMHG | OXYGEN SATURATION: 96 % | SYSTOLIC BLOOD PRESSURE: 147 MMHG | BODY MASS INDEX: 46.65 KG/M2 | TEMPERATURE: 98 F

## 2025-04-16 DIAGNOSIS — H92.03 EAR PAIN, BILATERAL: Primary | ICD-10-CM

## 2025-04-16 DIAGNOSIS — G50.0 TRIGEMINAL NEURALGIA: ICD-10-CM

## 2025-04-16 PROCEDURE — 99212 OFFICE O/P EST SF 10 MIN: CPT

## 2025-04-16 ASSESSMENT — ENCOUNTER SYMPTOMS
ALLERGIC/IMMUNOLOGIC NEGATIVE: 1
EYE PAIN: 0
VOMITING: 0
SINUS PRESSURE: 0
RHINORRHEA: 0
SORE THROAT: 0
BACK PAIN: 0
DIARRHEA: 0
SHORTNESS OF BREATH: 1
COUGH: 0
EYE DISCHARGE: 0

## 2025-04-16 NOTE — PROGRESS NOTES
rigidity.   Skin:     General: Skin is warm and dry.   Neurological:      General: No focal deficit present.      Mental Status: He is alert and oriented to person, place, and time.   Psychiatric:         Attention and Perception: Attention normal.         Mood and Affect: Affect normal.         Behavior: Behavior normal. Behavior is cooperative.         Thought Content: Thought content normal.         Judgment: Judgment normal.         CT results            Assessment:       Diagnosis Orders   1. Ear pain, bilateral  [facility]      2. Trigeminal neuralgia  [facility]        Plan:      Juni is a 29-year-old male who returns to the office today for repeat evaluation of ear pain.  He states that he does experience continued ear pain in bilateral ears.  He states that the left ear has been worse than the right.  He reports postauricular and pinna pain.  He states that when the pain comes on it can last half a day.  He states he has also found more recently that if he puts pressure with his hand on the area the symptoms subside within minutes.  He reports the pain as a burning ache that can be sharp when he pushes on it.  He has tried use of NSAIDs and warm compresses with minimal to no alleviation of symptoms.  CT of the IACDid reveal borderline dehiscence of the left sigmoid plate but the remaining left temporal bone structures appeared unremarkable.  It was explained to the patient that this does not correspond to his symptoms and that symptoms do appear to coincide with some type of neuralgia.  A referral will be placed to neurology.  He elected to follow-up with ENT on an as-needed basis.  He was instructed to call the office for any unresolved or new symptoms in the future.    Follow up prn    RX given today:  none    Eben Mayberry MSN, FNP-BC  [facility]    The information contained in this note has been dictated using drug and medical speech